# Patient Record
Sex: MALE | Race: WHITE
[De-identification: names, ages, dates, MRNs, and addresses within clinical notes are randomized per-mention and may not be internally consistent; named-entity substitution may affect disease eponyms.]

---

## 2020-06-22 ENCOUNTER — HOSPITAL ENCOUNTER (EMERGENCY)
Dept: HOSPITAL 35 - ER | Age: 40
Discharge: ANOTHER HOSPITAL | End: 2020-06-22
Payer: COMMERCIAL

## 2020-06-22 VITALS — SYSTOLIC BLOOD PRESSURE: 145 MMHG | DIASTOLIC BLOOD PRESSURE: 87 MMHG

## 2020-06-22 VITALS — BODY MASS INDEX: 40.43 KG/M2 | WEIGHT: 315 LBS | HEIGHT: 74 IN

## 2020-06-22 DIAGNOSIS — R00.0: ICD-10-CM

## 2020-06-22 DIAGNOSIS — R79.1: ICD-10-CM

## 2020-06-22 DIAGNOSIS — M79.89: ICD-10-CM

## 2020-06-22 DIAGNOSIS — M54.2: ICD-10-CM

## 2020-06-22 DIAGNOSIS — Z88.1: ICD-10-CM

## 2020-06-22 DIAGNOSIS — R51: ICD-10-CM

## 2020-06-22 DIAGNOSIS — R07.81: Primary | ICD-10-CM

## 2020-06-22 DIAGNOSIS — Z79.899: ICD-10-CM

## 2020-06-22 DIAGNOSIS — F17.210: ICD-10-CM

## 2020-06-22 DIAGNOSIS — E66.01: ICD-10-CM

## 2020-06-22 DIAGNOSIS — Z87.442: ICD-10-CM

## 2020-06-22 DIAGNOSIS — R21: ICD-10-CM

## 2020-06-22 LAB
ANION GAP SERPL CALC-SCNC: 7 MMOL/L (ref 7–16)
BASOPHILS NFR BLD AUTO: 0.6 % (ref 0–2)
BUN SERPL-MCNC: 13 MG/DL (ref 7–18)
CALCIUM SERPL-MCNC: 8.6 MG/DL (ref 8.5–10.1)
CHLORIDE SERPL-SCNC: 100 MMOL/L (ref 98–107)
CO2 SERPL-SCNC: 27 MMOL/L (ref 21–32)
CREAT SERPL-MCNC: 0.9 MG/DL (ref 0.7–1.3)
EOSINOPHIL NFR BLD: 2.3 % (ref 0–3)
ERYTHROCYTE [DISTWIDTH] IN BLOOD BY AUTOMATED COUNT: 14.7 % (ref 10.5–14.5)
GLUCOSE SERPL-MCNC: 117 MG/DL (ref 74–106)
GRANULOCYTES NFR BLD MANUAL: 73.3 % (ref 36–66)
HCT VFR BLD CALC: 40.7 % (ref 42–52)
HGB BLD-MCNC: 13.6 GM/DL (ref 14–18)
LYMPHOCYTES NFR BLD AUTO: 19.3 % (ref 24–44)
MCH RBC QN AUTO: 28.9 PG (ref 26–34)
MCHC RBC AUTO-ENTMCNC: 33.4 G/DL (ref 28–37)
MCV RBC: 86.5 FL (ref 80–100)
MONOCYTES NFR BLD: 4.5 % (ref 1–8)
NEUTROPHILS # BLD: 6.4 THOU/UL (ref 1.4–8.2)
PLATELET # BLD: 257 THOU/UL (ref 150–400)
POTASSIUM SERPL-SCNC: 3.9 MMOL/L (ref 3.5–5.1)
RBC # BLD AUTO: 4.71 MIL/UL (ref 4.5–6)
SODIUM SERPL-SCNC: 134 MMOL/L (ref 136–145)
TROPONIN I SERPL-MCNC: <0.06 NG/ML (ref ?–0.06)
WBC # BLD AUTO: 8.8 THOU/UL (ref 4–11)

## 2020-06-23 NOTE — EKG
[facility]
[address]
[place]   [address]                     ELECTROCARDIOGRAM REPORT      
_______________________________________________________________________________
 
Name:       TEMI MOON                Room #:                     DEP [facility]#:      [id number]                       Account #:      [id number]  
Admission:  20    Attend Phys:                          
Discharge:  20    Date of Birth:  [birth year]  
                                                          Report #: [id number]
                                                                    [id number]
_______________________________________________________________________________
THIS REPORT FOR:  
 
cc:  LALI - Shiela family physician/PCP 
     LALI - Shiela family physician/PCP 
     Prince Andres MD [facility]
THIS REPORT FOR:   //name//                          
 
                         [facility] ED
                                       
Test Date:    2020               Test Time:    07:49:22
Pat Name:     TEMI MOON             Department:   
Patient ID:   [id number]            Room:          
Gender:                               Technician:   [facility]
:          1980               Requested By: Carlos Heard
Order Number: [id number] MD:   Prince Andres
                                 Measurements
Intervals                              Axis          
Rate:         97                       P:            25
MN:           154                      QRS:          8
QRSD:         101                      T:            35
QT:           365                                    
QTc:          464                                    
                           Interpretive Statements
Sinus rhythm
No significant abnormality
No previous ECG available for comparison
 
Electronically Signed On 2020 7:46:12 CDT by Prince Andres
[email or web address]
 
 
 
 
 
 
 
 
 
 
 
 
 
 
 
  <ELECTRONICALLY SIGNED>
   By: Prince Andres MD, [facility]   
  20     0746
D: 2049                           _____________________________________
T: 2049                           Prince Andres MD, [facility]     /EPI

## 2020-07-01 ENCOUNTER — HOSPITAL ENCOUNTER (EMERGENCY)
Dept: HOSPITAL 35 - ER | Age: 40
Discharge: HOME | End: 2020-07-01
Payer: COMMERCIAL

## 2020-07-01 VITALS — BODY MASS INDEX: 40.43 KG/M2 | HEIGHT: 74 IN | WEIGHT: 315 LBS

## 2020-07-01 VITALS — SYSTOLIC BLOOD PRESSURE: 147 MMHG | DIASTOLIC BLOOD PRESSURE: 93 MMHG

## 2020-07-01 DIAGNOSIS — Z88.1: ICD-10-CM

## 2020-07-01 DIAGNOSIS — Z79.899: ICD-10-CM

## 2020-07-01 DIAGNOSIS — F17.210: ICD-10-CM

## 2020-07-01 DIAGNOSIS — M10.9: ICD-10-CM

## 2020-07-01 DIAGNOSIS — T78.3XXA: Primary | ICD-10-CM
